# Patient Record
Sex: MALE | Race: OTHER
[De-identification: names, ages, dates, MRNs, and addresses within clinical notes are randomized per-mention and may not be internally consistent; named-entity substitution may affect disease eponyms.]

---

## 2018-06-11 ENCOUNTER — HOSPITAL ENCOUNTER (INPATIENT)
Dept: HOSPITAL 72 - SDSOVERFLO | Age: 43
LOS: 2 days | Discharge: HOME | DRG: 520 | End: 2018-06-13
Payer: COMMERCIAL

## 2018-06-11 VITALS — DIASTOLIC BLOOD PRESSURE: 66 MMHG | SYSTOLIC BLOOD PRESSURE: 106 MMHG

## 2018-06-11 VITALS — WEIGHT: 228 LBS | HEIGHT: 68 IN | BODY MASS INDEX: 34.56 KG/M2

## 2018-06-11 VITALS — SYSTOLIC BLOOD PRESSURE: 141 MMHG | DIASTOLIC BLOOD PRESSURE: 90 MMHG

## 2018-06-11 VITALS — DIASTOLIC BLOOD PRESSURE: 52 MMHG | SYSTOLIC BLOOD PRESSURE: 94 MMHG

## 2018-06-11 VITALS — DIASTOLIC BLOOD PRESSURE: 52 MMHG | SYSTOLIC BLOOD PRESSURE: 88 MMHG

## 2018-06-11 VITALS — DIASTOLIC BLOOD PRESSURE: 64 MMHG | SYSTOLIC BLOOD PRESSURE: 110 MMHG

## 2018-06-11 VITALS — SYSTOLIC BLOOD PRESSURE: 92 MMHG | DIASTOLIC BLOOD PRESSURE: 56 MMHG

## 2018-06-11 VITALS — DIASTOLIC BLOOD PRESSURE: 75 MMHG | SYSTOLIC BLOOD PRESSURE: 104 MMHG

## 2018-06-11 VITALS — DIASTOLIC BLOOD PRESSURE: 64 MMHG | SYSTOLIC BLOOD PRESSURE: 113 MMHG

## 2018-06-11 VITALS — SYSTOLIC BLOOD PRESSURE: 103 MMHG | DIASTOLIC BLOOD PRESSURE: 66 MMHG

## 2018-06-11 VITALS — SYSTOLIC BLOOD PRESSURE: 116 MMHG | DIASTOLIC BLOOD PRESSURE: 73 MMHG

## 2018-06-11 VITALS — SYSTOLIC BLOOD PRESSURE: 108 MMHG | DIASTOLIC BLOOD PRESSURE: 65 MMHG

## 2018-06-11 VITALS — DIASTOLIC BLOOD PRESSURE: 72 MMHG | SYSTOLIC BLOOD PRESSURE: 116 MMHG

## 2018-06-11 DIAGNOSIS — M48.061: ICD-10-CM

## 2018-06-11 DIAGNOSIS — F17.200: ICD-10-CM

## 2018-06-11 DIAGNOSIS — E66.9: ICD-10-CM

## 2018-06-11 DIAGNOSIS — M51.16: Primary | ICD-10-CM

## 2018-06-11 DIAGNOSIS — M51.17: ICD-10-CM

## 2018-06-11 PROCEDURE — 01NB0ZZ RELEASE LUMBAR NERVE, OPEN APPROACH: ICD-10-PCS

## 2018-06-11 PROCEDURE — 87081 CULTURE SCREEN ONLY: CPT

## 2018-06-11 PROCEDURE — 76000 FLUOROSCOPY <1 HR PHYS/QHP: CPT

## 2018-06-11 PROCEDURE — 86901 BLOOD TYPING SEROLOGIC RH(D): CPT

## 2018-06-11 PROCEDURE — 94150 VITAL CAPACITY TEST: CPT

## 2018-06-11 PROCEDURE — 36415 COLL VENOUS BLD VENIPUNCTURE: CPT

## 2018-06-11 PROCEDURE — 86900 BLOOD TYPING SEROLOGIC ABO: CPT

## 2018-06-11 PROCEDURE — 0SB40ZZ EXCISION OF LUMBOSACRAL DISC, OPEN APPROACH: ICD-10-PCS

## 2018-06-11 PROCEDURE — 72020 X-RAY EXAM OF SPINE 1 VIEW: CPT

## 2018-06-11 PROCEDURE — 94003 VENT MGMT INPAT SUBQ DAY: CPT

## 2018-06-11 PROCEDURE — 86850 RBC ANTIBODY SCREEN: CPT

## 2018-06-11 PROCEDURE — 82962 GLUCOSE BLOOD TEST: CPT

## 2018-06-11 PROCEDURE — 0SB20ZZ EXCISION OF LUMBAR VERTEBRAL DISC, OPEN APPROACH: ICD-10-PCS

## 2018-06-11 RX ADMIN — MORPHINE SULFATE PRN MG: 4 INJECTION INTRAVENOUS at 14:32

## 2018-06-11 RX ADMIN — MORPHINE SULFATE PRN MG: 4 INJECTION INTRAVENOUS at 21:51

## 2018-06-11 RX ADMIN — SODIUM CHLORIDE SCH MLS/HR: 0.9 INJECTION INTRAVENOUS at 15:30

## 2018-06-11 RX ADMIN — DEXAMETHASONE SODIUM PHOSPHATE SCH MG: 4 INJECTION, SOLUTION INTRAMUSCULAR; INTRAVENOUS at 21:51

## 2018-06-11 RX ADMIN — DOCUSATE SODIUM SCH MG: 100 CAPSULE, LIQUID FILLED ORAL at 09:00

## 2018-06-11 RX ADMIN — DEXTROSE AND SODIUM CHLORIDE SCH MLS/HR: 5; .45 INJECTION, SOLUTION INTRAVENOUS at 14:32

## 2018-06-11 RX ADMIN — DOCUSATE SODIUM SCH MG: 100 CAPSULE, LIQUID FILLED ORAL at 18:00

## 2018-06-11 RX ADMIN — DOCUSATE SODIUM SCH MG: 100 CAPSULE, LIQUID FILLED ORAL at 18:42

## 2018-06-11 NOTE — BRIEF OPERATIVE NOTE
Immediate Post Operative Note


Operative Note


Pre-op Diagnosis:


lumbar hnp with radiculopathy


Procedure:


right L45 and L5S1 laminotomies, medial facetectomies, foraminotomies and 

microdiscectomies


Post-op Diagnosis:  same as pre-op


Findings:  consistent w/pre-op dx studies


Surgeon:  ARVIN


Assistant:  LEONID


Anesthesiologist:  DANILO


Anesthesia:  general


Specimen:  yes


Complications:  none


Condition:  stable


Fluids:  1200CC


Estimated Blood Loss:  volume - 150CC


Drains:  none


Implant(s) used?:  No











Epifanio Yarbrough MD Jun 11, 2018 10:53

## 2018-06-11 NOTE — ANETHESIA PREOPERATIVE EVAL
Anesthesia Pre-op PMH/ROS


General


Date of Evaluation:  Jun 11, 2018


Time of Evaluation:  07:12


Anesthesiologist:  Denise


ASA Score:  ASA 2


Mallampati Score


Class I : Soft palate, uvula, fauces, pillars visible


Class II: Soft palate, uvula, fauces visible


Class III: Soft palate, base of uvula visible


Class IV: Only hard plate visible


Mallampati Classification:  Class II


Surgeon:  Zenno


Diagnosis:  Lumbar radiculopathy


Surgical Procedure:  L4-L5 L5-S1 Laminotomy with decompression


Anesthesia History:  none


Social History:  current smoker


Family History:  no anesthesia problems


Allergies:  


Coded Allergies:  


     No Known Allergies (Unverified , 6/11/18)


Medications:  see eMAR





Past Medical History


Cardiovascular:  Denies: HTN, CAD, MI, valve dz, arrhythmia, other


Pulmonary:  Denies: asthma, COPD, BILLY, other


Gastrointestinal/Genitourinary:  Reports: GERD; 


   Denies: CRI, ESRD, other


Neurologic/Psychiatric:  Reports: other - chronic pain; 


   Denies: dementia, CVA, depression/anxiety, TIA


Endocrine:  Reports: DM - borderline; 


   Denies: hypothyroidism, steroids, other


HEENT:  Denies: cataract (L), cataract (R), glaucoma, [place] (L), [place] (R), other


Hematology/Immune:  Denies: anemia, DVT, bleeding disorder, other


Musculoskeletal/Integumentary:  Denies: OA, RA, DJD, DDD, edema, other


Other:  obesity


PMH Narrative:


as above


PSxH Narrative:


Hernia repair





Anesthesia Pre-op Phys. Exam


Physician Exam





Last Vital Signs








  Date Time  Temp Pulse Resp B/P (MAP) Pulse Ox O2 Delivery O2 Flow Rate FiO2


 


6/11/18 06:27 98.0 65 18 141/90 97 Room Air  





 98.0       








Constitutional:  NAD


Neurologic:  CN 2-12 intact


Cardiovascular:  RRR, no M/R/G


Respiratory:  CTA


Gastrointestinal:  S/NT/ND





Airway Exam


Mallampati Score:  Class II


MO:  full


Neck:  flexible


ROM:  full


Teeth:  intact


Dentures:  no upper, no lower





Anesthesia Pre-op A/P


Labs


see chart


Accucheck


106 preoperatively, patient has significant family of DM and his preop lab work 

gave a BS of 132, unsure if fasting . follow up recommended





Studies


Pre-op Studies:  EKG - NSR, CXR - WNL





Risk Assessment & Plan


Assessment:


ASA 2


Plan:


GA with ETT prone position neuromonitoring


Status Change Before Surgery:  No





Pre-Antibiotics


Drug:  Ancef 2 gr.


Given Within 1 Hr of Incision:  Yes


Time Given:  07:52











Fidel Jaimes MD Jun 11, 2018 08:44

## 2018-06-11 NOTE — IMMEDIATE POST-OP EVALUATION
Immediate Post-Op Evalulation


Immediate Post-Op Evalulation


Procedure:  L4-L5 L5-S1 laminotomy with decompression


Date of Evaluation:  Jun 11, 2018


Time of Evaluation:  11:19


IV Fluids:  1300


Blood Products:  none


Estimated Blood Loss:  150


Urinary Output:  200


Blood Pressure Systolic:  98


Blood Pressure Diastolic:  56


Pulse Rate:  78


Respiratory Rate:  20


O2 Sat by Pulse Oximetry:  99


Temperature (Fahrenheit):  97.8


Pain Score (1-10):  1


Nausea:  No


Vomiting:  No


Complications


none


Patient Status:  reacts, patent, extubated, none


Hydration Status:  adequate











Fidel Jaimes MD Jun 11, 2018 11:20

## 2018-06-11 NOTE — OPERATIVE NOTE - DICTATED
DATE OF OPERATION:  06/11/2018



PREOPERATIVE DIAGNOSES:  L4-L5 and L5-S1 disk protrusions with stenosis and

right lower extremity radiculopathy.



POSTOPERATIVE DIAGNOSES:  L4-L5 and L5-S1 disk protrusions with stenosis

and right lower extremity radiculopathy.



PROCEDURE PERFORMED:

1. Right-sided L5-S1 medial facetectomy, laminotomy, foraminotomy, and

microdiskectomy.

2. Right-sided L4-L5 medial facetectomy, laminotomy, foraminotomy, and

microdiskectomy.

3. Intraoperative use of microscope for microdissection.



SURGEON:  Epifanio Yarbrough M.D.



ASSISTANT:  Anton Hernandez M.D.



ANESTHESIOLOGIST:  Dr. Jaimes.



ANESTHESIA:  General endotracheal anesthesia.



INTRAOPERATIVE FINDINGS:  Lateral recess and foraminal stenosis at L4-L5

and L5-S1 on the right side with disk protrusions causing impingement of

the traversing nerve root and foraminal stenosis for the exiting nerve

roots.



ESTIMATED BLOOD LOSS:  150 mL.



FLUIDS:  1200 mL of crystalloid.



INDICATIONS:  This is a pleasant gentleman who failed nonoperative

treatment and option for above treatment was given.  Risks, alternatives,

and benefits were discussed with the patient at length.  Risks include but

are not limited to anesthesia complications including death, medical

complications including liver, kidney, and cardiopulmonary deficits,

bleeding, infection, pars fracture, instability, nerve injury,

reherniation, and continued symptoms.  The patient understood and wished

to proceed.



DESCRIPTION OF OPERATION:  The patient was brought to the operating room,

supine on a stretcher.  Subsequently, appropriate IV lines were placed and

2 g of Ancef was administered.  A surgical time-out was called.

Anesthesia was induced.  The patient was successfully intubated.

Sequential compression devices were placed onto the bilateral lower

extremities.  The patient was gently turned over prone on the Michael frame

table and all bony prominences were well padded including the upper

extremity and the lower extremity, the ulnar and median nerves in the

upper extremities and the peroneal nerve in the lower extremities.

Preoperative x-rays were done and the L4-L5 and L5-S1 levels were

identified.  The midline was marked.  The back was prepped and draped in

usual sterile fashion.  Intraoperatively sterilely draped microscope was

brought into the field and at this point, a scalpel was used to incise the

skin and with monopolar cautery, an approach to the right side of the

laminas of L4-L5 and L5-S1 was done and a radiopaque marker was placed at

the S1 pedicle level and thus, via lateral fluoroscopy, the L5-S1 and

L4-L5 levels were positively identified.   retractors were

placed at L5-S1 initially and with a high-speed drill, #1 through #5

Kerrison punches, straight and curved curette as well as a dental probe

and a nerve hook, a medial facetectomy, foraminotomy, and lateral recess

decompression was done.  There was severe foraminal stenosis which was

decompressed with #1 and #2 Kerrison punch as well as a curved curette.

The medial aspect of the superior facet which was impinging on the

traversing nerve root was also removed and the S1 pedicle was

skeletonized.  With a Penfield 4 retractor as well as a nerve root

retractor, the disk space was identified and a disk protrusion was found

which was impinging on the neural elements.  With an #11 blade, a slit

incision was made in the posterior annulus and with the use of pituitary

rongeurs, Delia Nuñez Epstein, a microdiskectomy entailed until the

floor of the canal was flat.  All loose debris was removed from the disk

space.  Triple Antibiotic disk space irrigation was done.  All loose

material was removed and Valsalva at 40 mmHg was done and there was no CSF

leak.  Now, attention was diverted to the L4-L5 level.  

retractors were moved to the L4-L5 level and with the same instruments, a

medial facetectomy, foraminotomy, lateral recess decompression entailed.

There was foraminal stenosis for the exiting nerve root and was

decompressed with #1 and #2 Kerrison punches and at this point, the neural

elements were carefully medially retracted.  A disk protrusion was found

impinging on the neural elements and with an #11 blade, a slit incision

was made in the posterior annulus and with the use of pituitary rongeurs,

Jay Nuñez, and nerve hook, a microdiskectomy was done.  All loose

debris was removed until the floor of the canal was flat.  There was no

further impingement.  Valsalva was done at 40 mmHg.  There was no CSF leak

and now attention was diverted to closure of the wound.  It was copiously

irrigated with Triple Antibiotic solution.  Closure of the dorsal lumbar

fascia was closed with #1 Vicryl sutures in a watertight interrupted

fashion.  The subcuticular and subdermal layers were closed with 2-0

Vicryl sutures.  All sponge, needle, and instrument counts were correct.

The skin was closed with Dermabond.  Sterile dressing and tape was placed.

The patient was turned supine, was extubated in stable condition, was

taken to the recovery room in stable condition and was found to be

neurovascularly intact and was admitted to the hospital.









  ______________________________________________

  Epifanio Yarbrough M.D.





DR:  Camille

D:  06/11/2018 17:22

T:  06/11/2018 17:36

JOB#:  [id number]

CC:



BRIA

## 2018-06-11 NOTE — GENERAL PROGRESS NOTE
Progress Note


Progress Note


severe initial pain in the left hand in the median nerve distribution. 


Now better after steroids. 


MRI done. Awaiting results. 





avss


a and o times 3


min lbp 


no leg pain 


l knee pain - preop


l hand weak apb


decreased lt radial 3 digits 


otherwise 5/5 motor ue and le 





a: oob pt 


steroids one more day


dc tomorrow 


future ctr's











Epifanio Yarbrough MD Jun 11, 2018 18:22

## 2018-06-11 NOTE — DIAGNOSTIC IMAGING REPORT
Indication: Pain, intraoperative imaging

 

Technique: Intraoperative images

 

Comparison: none

 

Findings: Intraoperative imaging demonstrates surgical needles posterior to L4 and L5

this is imaging demonstrates surgical tool posterior to the top of S1

 

Impression: Intraoperative imaging, as described

## 2018-06-11 NOTE — PRE-PROCEDURE NOTE/ATTESTATION
Pre-Procedure Note/Attestation


Complete Prior to Procedure


Procedure Narrative:


right L45 and L5S1 laminotomies, medial facetectomies, foraminotomies and 

microdiscectomy





Indications for Procedure


Pre-Operative Diagnosis:


lumbar hnp with radiculopathy





Attestation


I attest that I discussed the nature of the procedure; its benefits; risks and 

complications; and alternatives (and the risks and benefits of such alternatives

), prior to the procedure, with the patient (or the patient's legal 

representative).





I attest that, if there was a reasonable possibility of needing a blood 

transfusion, the patient (or the patient's legal representative) was given the 

[place] [employer] standardized written summary, pursuant 

to the [place] Blood Safety Act ([place] Health and Safety Code # 1645, as 

amended).





I attest that I re-evaluated the patient just prior to the surgery and that 

there has been no change in the patient's H&P, except as documented below:











Epifanio Yarbrough MD Jun 11, 2018 07:14

## 2018-06-12 VITALS — DIASTOLIC BLOOD PRESSURE: 73 MMHG | SYSTOLIC BLOOD PRESSURE: 114 MMHG

## 2018-06-12 VITALS — DIASTOLIC BLOOD PRESSURE: 85 MMHG | SYSTOLIC BLOOD PRESSURE: 118 MMHG

## 2018-06-12 VITALS — DIASTOLIC BLOOD PRESSURE: 71 MMHG | SYSTOLIC BLOOD PRESSURE: 119 MMHG

## 2018-06-12 VITALS — SYSTOLIC BLOOD PRESSURE: 123 MMHG | DIASTOLIC BLOOD PRESSURE: 69 MMHG

## 2018-06-12 RX ADMIN — DEXTROSE AND SODIUM CHLORIDE SCH MLS/HR: 5; .45 INJECTION, SOLUTION INTRAVENOUS at 10:00

## 2018-06-12 RX ADMIN — SODIUM CHLORIDE SCH MLS/HR: 0.9 INJECTION INTRAVENOUS at 06:21

## 2018-06-12 RX ADMIN — MORPHINE SULFATE PRN MG: 4 INJECTION INTRAVENOUS at 14:36

## 2018-06-12 RX ADMIN — DOCUSATE SODIUM SCH MG: 100 CAPSULE, LIQUID FILLED ORAL at 18:03

## 2018-06-12 RX ADMIN — MORPHINE SULFATE PRN MG: 4 INJECTION INTRAVENOUS at 11:37

## 2018-06-12 RX ADMIN — MORPHINE SULFATE PRN MG: 4 INJECTION INTRAVENOUS at 18:03

## 2018-06-12 RX ADMIN — MORPHINE SULFATE PRN MG: 4 INJECTION INTRAVENOUS at 23:58

## 2018-06-12 RX ADMIN — MORPHINE SULFATE PRN MG: 4 INJECTION INTRAVENOUS at 04:53

## 2018-06-12 RX ADMIN — DEXAMETHASONE SODIUM PHOSPHATE SCH MG: 4 INJECTION, SOLUTION INTRAMUSCULAR; INTRAVENOUS at 04:53

## 2018-06-12 RX ADMIN — MORPHINE SULFATE PRN MG: 4 INJECTION INTRAVENOUS at 07:51

## 2018-06-12 RX ADMIN — MORPHINE SULFATE PRN MG: 4 INJECTION INTRAVENOUS at 00:52

## 2018-06-12 RX ADMIN — SODIUM CHLORIDE SCH MLS/HR: 0.9 INJECTION INTRAVENOUS at 00:52

## 2018-06-12 RX ADMIN — DEXTROSE AND SODIUM CHLORIDE SCH MLS/HR: 5; .45 INJECTION, SOLUTION INTRAVENOUS at 22:06

## 2018-06-12 RX ADMIN — DEXTROSE AND SODIUM CHLORIDE SCH MLS/HR: 5; .45 INJECTION, SOLUTION INTRAVENOUS at 00:53

## 2018-06-12 RX ADMIN — DOCUSATE SODIUM SCH MG: 100 CAPSULE, LIQUID FILLED ORAL at 07:50

## 2018-06-12 NOTE — 48 HOUR POST ANESTHESIA EVAL
Post Anesthesia Evaluation


Procedure:  L4-L5 L5-S1 laminotomy with decompression


Date of Evaluation:  Jun 12, 2018


Time of Evaluation:  10:20


Blood Pressure Systolic:  114


0:  73


Pulse Rate:  75


Respiratory Rate:  20


Temperature (Fahrenheit):  98.2


O2 Sat by Pulse Oximetry:  96


Airway:  patent


Nausea:  No


Vomiting:  No


Pain Intensity:  4 - Patient was OOB with therapy


Hydration Status:  adequate


Cardiopulmonary Status:


Stable


Mental Status/LOC:  patient returned to baseline


Follow-up Care/Observations:


As per surgery


Post-Anesthesia Complications:


No anesthetic complication


Follow-up care needed:  N/A











Torsten Anglin MD Jun 12, 2018 08:20

## 2018-06-12 NOTE — DIAGNOSTIC IMAGING REPORT
Indication: Postoperative wrist pain

 

Technique: Axial T1, axial STIR, axial pre and postcontrast T1 fat saturated images

obtained. No other sequences could be obtained, as patient unable to tolerate. In

addition, the wrist coil could not be utilized due to pain. Patient unable to stay

still despite sedation

 

Comparison: none

 

Findings: Exam is limited due to motion artifact. The STIR sequences demonstrate no

definite significant muscular edema or unusual contrast enhancement, but are very

limited due to motion. The postcontrast sequences are chronic was very limited due to

motion and susceptibility artifact. No definite unusual contrast enhancement

demonstrated. No bone marrow abnormality demonstrated

 

Impression: Very limited exam, as described.

 

No definite soft tissue edema or enhancement or osseous abnormality demonstrated.

 

This agrees with the preliminary interpretation provided overnight by [facility]

teleradiology service.

## 2018-06-13 VITALS — DIASTOLIC BLOOD PRESSURE: 79 MMHG | SYSTOLIC BLOOD PRESSURE: 121 MMHG

## 2018-06-13 VITALS — DIASTOLIC BLOOD PRESSURE: 78 MMHG | SYSTOLIC BLOOD PRESSURE: 121 MMHG

## 2018-06-13 VITALS — SYSTOLIC BLOOD PRESSURE: 120 MMHG | DIASTOLIC BLOOD PRESSURE: 78 MMHG

## 2018-06-13 RX ADMIN — MORPHINE SULFATE PRN MG: 4 INJECTION INTRAVENOUS at 04:00

## 2018-06-13 RX ADMIN — MORPHINE SULFATE PRN MG: 4 INJECTION INTRAVENOUS at 07:50

## 2018-06-13 RX ADMIN — DOCUSATE SODIUM SCH MG: 100 CAPSULE, LIQUID FILLED ORAL at 08:35

## 2018-06-13 RX ADMIN — DEXTROSE AND SODIUM CHLORIDE SCH MLS/HR: 5; .45 INJECTION, SOLUTION INTRAVENOUS at 06:00

## 2018-06-13 NOTE — GENERAL PROGRESS NOTE
Progress Note


Progress Note


mild lbp and no leg pain 





continues to have pain in the wrists and hands left more than right with n/t/p/

n paresthesias and l knee pain 


using walker





avss 


a and o times 3


inc cdi


5/5 motor in the le


neg slr


l wrist and r wrist pos tinels and phalens with decreased sensation median nerve


l pain mild effusion with mjlt and ljlt 








A: stable spin 


b/l severe ctr worseneing 





P: dc today 


follow up on monday


needs MRI L knee 


plan ctr's











Epifanio Yarbrough MD Jun 13, 2018 07:43

## 2018-06-14 NOTE — DISCHARGE SUMMARY
Discharge Summary


Hospital Course


Date of Admission


Jun 11, 2018 at 06:01


Date of Discharge


Jun 13, 2018 at 10:37


Admitting Diagnosis


lumbar radiculopathy





Reason for Hospitalization:  Elective surgery


HPI


John Rahman is a 43 year old male who was admitted on Jun 11, 2018 at 06:01 

for Lumbar Radiculopathy.


Patient was admitted for elective surgery.


Procedures





s/p 6/11/18 by dr Yarbrough 


1. Right-sided L5-S1 medial facetectomy, laminotomy, foraminotomy, and


microdiskectomy.


2. Right-sided L4-L5 medial facetectomy, laminotomy, foraminotomy, and


microdiskectomy.


3. Intraoperative use of microscope for microdissection.


Hospital Course


status post surgery


pain b/l hand L>R c/w CTS


MRI left upper extremity -No definite soft tissue edema or enhancement or 

osseous abnormality demonstrated.


short pulse of steroids


pain management, pain addressed and controlled 


incision C/D/I





ambulated with PT  with walker


fall precautions, 


tolerated diet  


voided freely





dc instructions provided 


fup as out/pt with surgeon on Monday  


will need MRI L knee and plan for CTR 








FINAL DIAGNOSES


L4-L5 and L5-S1 disk protrusions with stenosis and right lower extremity 

radiculopathy.


s/p right L4-5 and L5-S1 laminotomies, medial facetectomies, foraminotomies and 

microdiscectomy 


b/l CTS -worse


L knee pain ( preop)





Discharge Medications


Continued Medications:  


Carisoprodol* (Soma*) 350 Mg Tablet


350 MG PO THREE TIMES A DAY PRN for For Pain, TAB (This prescription has been 

renewed)





Hydrocodone Bit/Acetaminophen 5-325* (Norco 5-325*) 1 Each Tablet


1 TAB ORAL Q6H PRN for For Pain, #10 TAB 0 Refills (This prescription has been 

renewed)





 


Discontinued Medications:  


Ibuprofen* (Motrin*) 600 Mg Tablet


600 MG ORAL Q6H PRN for For Pain, #30 TAB











Discharge


Condition Upon Discharge:  stable


Discharge Disposition


Patient was discharged to Home (01)





Discharge Instructions


Discharge Instructions


Special Instructions


I have been assigned to complete a D/C Summary on this account. I was not 

involved in the patient management











Evelin Awad NP Jun 14, 2018 12:24